# Patient Record
Sex: MALE | Race: WHITE | NOT HISPANIC OR LATINO | URBAN - METROPOLITAN AREA
[De-identification: names, ages, dates, MRNs, and addresses within clinical notes are randomized per-mention and may not be internally consistent; named-entity substitution may affect disease eponyms.]

---

## 2021-11-12 ENCOUNTER — OUT OF OFFICE VISIT (OUTPATIENT)
Dept: URBAN - METROPOLITAN AREA MEDICAL CENTER 30 | Facility: MEDICAL CENTER | Age: 56
End: 2021-11-12
Payer: SELF-PAY

## 2021-11-12 DIAGNOSIS — K92.1 ACUTE MELENA: ICD-10-CM

## 2021-11-12 DIAGNOSIS — D50.9 ANEMIA: ICD-10-CM

## 2021-11-12 DIAGNOSIS — K31.7 BENIGN GASTRIC POLYP: ICD-10-CM

## 2021-11-12 DIAGNOSIS — Z87.11 H/O PEPTIC ULCER: ICD-10-CM

## 2021-11-12 DIAGNOSIS — F10.10 AA (ALCOHOL ABUSE): ICD-10-CM

## 2021-11-12 DIAGNOSIS — K31.89 ACQUIRED DEFORMITY OF DUODENUM: ICD-10-CM

## 2021-11-12 DIAGNOSIS — Z79.1 ENCNTR LONG-TERM NSAID USE: ICD-10-CM

## 2021-11-12 PROCEDURE — 43239 EGD BIOPSY SINGLE/MULTIPLE: CPT | Performed by: INTERNAL MEDICINE

## 2021-11-12 PROCEDURE — 99253 IP/OBS CNSLTJ NEW/EST LOW 45: CPT | Performed by: INTERNAL MEDICINE

## 2021-11-15 ENCOUNTER — TELEPHONE ENCOUNTER (OUTPATIENT)
Dept: URBAN - METROPOLITAN AREA CLINIC 74 | Facility: CLINIC | Age: 56
End: 2021-11-15

## 2021-11-17 ENCOUNTER — OFFICE VISIT (OUTPATIENT)
Dept: URBAN - METROPOLITAN AREA CLINIC 74 | Facility: CLINIC | Age: 56
End: 2021-11-17
Payer: SELF-PAY

## 2021-11-17 ENCOUNTER — WEB ENCOUNTER (OUTPATIENT)
Dept: URBAN - METROPOLITAN AREA CLINIC 74 | Facility: CLINIC | Age: 56
End: 2021-11-17

## 2021-11-17 VITALS
HEART RATE: 101 BPM | DIASTOLIC BLOOD PRESSURE: 60 MMHG | TEMPERATURE: 98.1 F | SYSTOLIC BLOOD PRESSURE: 150 MMHG | WEIGHT: 286 LBS | BODY MASS INDEX: 42.36 KG/M2 | OXYGEN SATURATION: 97 % | HEIGHT: 69 IN

## 2021-11-17 DIAGNOSIS — R42 LIGHTHEADED: ICD-10-CM

## 2021-11-17 DIAGNOSIS — D50.8 ACQUIRED IRON DEFICIENCY ANEMIA DUE TO DECREASED ABSORPTION: ICD-10-CM

## 2021-11-17 DIAGNOSIS — K29.90 GASTRITIS AND DUODENITIS: ICD-10-CM

## 2021-11-17 DIAGNOSIS — K44.9 HIATAL HERNIA: ICD-10-CM

## 2021-11-17 DIAGNOSIS — K92.1 GASTROINTESTINAL HEMORRHAGE WITH MELENA: ICD-10-CM

## 2021-11-17 DIAGNOSIS — K25.3 CAMERON LESION, ACUTE: ICD-10-CM

## 2021-11-17 PROBLEM — 196731005: Status: ACTIVE | Noted: 2021-11-17

## 2021-11-17 PROCEDURE — 99214 OFFICE O/P EST MOD 30 MIN: CPT | Performed by: INTERNAL MEDICINE

## 2021-11-17 RX ORDER — PANTOPRAZOLE SODIUM 40 MG/1
1 TABLET TABLET, DELAYED RELEASE ORAL ONCE A DAY
Status: ACTIVE | COMMUNITY

## 2021-11-17 RX ORDER — HYDROCODONE BITARTRATE AND ACETAMINOPHEN 5; 325 MG/1; MG/1
1 TABLET AS NEEDED TABLET ORAL
Status: ACTIVE | COMMUNITY

## 2021-11-17 NOTE — HPI-TODAY'S VISIT:
Hospital follow up. Pt seen by Dr. Summers and not known to me prior.  Unfortunately he has significant ongoing GI bleeding with melena and also bloody diarrhea.  This is been ongoing since Friday.  He feels lightheaded and does have some weakness. He started bleeding again Friday and now he notes black stool and also overt bloody diarrhea. Killian Hardy is a 56 y.o. yo male who was admitted on 11/12/2021 for GI blood loss.  The patient with a known history of PUD, S/P EGD in 1986, Chronic low back pain, L1 compression fracture, and Obesity presented to ED at  with GI bleeding started at 1:00 am. He stated that he woke up at 1:00 am with lower abdominal cramps and he had multiple black tarry stools with dark blood prior to EMT arrival and then he had a few episodes of tarry stools with bright red blood. He had syncopal episodes at home and EMT was called in. No previous history of GI blood loss. Remote history PUD in 1986 most likely secondary to ETOH. He is now drinking 20 oz of Rum 3-4 times per week. He takes Advil daily for his chronic back pain. He lives at home with his Mother and provide care for her. Arriving to ED with Hgb 12.1 and dropped to 9.6. BP 79/44 on arrival. No active bleeding in ED. Mother at bedside. The patient denies dyspepsia, dysphagia, odynophagia, hemoptysis, hematemesis, nausea, vomiting, regurgitation, constipation, diarrhea, hematochezia, fever, chills, chest pain, SOB, or any other GI complaints today. History of ETOH abuse and chewing Tobacco. Denies Illicit drug abuse. He is not vaccinated for Flu or COVID vaccine.  PROCEDURE: Upper GI Endoscopy    SURGEON:  Bryanna Summers MD    INDICATION: Melena and Unexplained iron deficiency anaemia   ATTENDING MD:  ENRIQUE Claros MD    ANESTHESIA: per cRNA      Procedure Note: After obtaining informed consent the patient was sedated. Throughout the procedure the patient's blood pressure, pulse and oxygen saturations were monitored continuously.  The adult Olympus high definition endoscope was lubricated and passed through the mouth to the second portion of the duodenum. The patient tolerated the procedure well. There were no immediate complications.    Findings:  Esophagus: Medium hiatal hernia. In hernia sac natasha's erosions- one large surrounded by elevated margin- biopsied. All clean based.  Stomach: Moderate erosive gastritis body and antrum- biopsied. Old blood noted in the stomach- rinsed and no re-accumulation. Duodenum: No gross lesions.    Impression: Hiatal hernia, natasha's erosions, erosive gastritis    Recommendations:  Continue PPI q12. Add carafate slurry qid. Await pathology. Advance diet as tolerated. If stable over next 24 hours can d/c with outpatient follow up.  Discussed with Dr Claros from [facility].    Bryanna Summers MD

## 2021-11-19 ENCOUNTER — LAB OUTSIDE AN ENCOUNTER (OUTPATIENT)
Dept: URBAN - METROPOLITAN AREA CLINIC 74 | Facility: CLINIC | Age: 56
End: 2021-11-19

## 2021-11-19 ENCOUNTER — DASHBOARD ENCOUNTERS (OUTPATIENT)
Age: 56
End: 2021-11-19

## 2021-11-19 ENCOUNTER — OFFICE VISIT (OUTPATIENT)
Dept: URBAN - METROPOLITAN AREA CLINIC 74 | Facility: CLINIC | Age: 56
End: 2021-11-19
Payer: SELF-PAY

## 2021-11-19 ENCOUNTER — TELEPHONE ENCOUNTER (OUTPATIENT)
Dept: URBAN - METROPOLITAN AREA CLINIC 74 | Facility: CLINIC | Age: 56
End: 2021-11-19

## 2021-11-19 DIAGNOSIS — K25.3 CAMERON LESION, ACUTE: ICD-10-CM

## 2021-11-19 DIAGNOSIS — D64.9 ANEMIA: ICD-10-CM

## 2021-11-19 DIAGNOSIS — Z12.11 COLON CANCER SCREENING: ICD-10-CM

## 2021-11-19 DIAGNOSIS — K92.1 GASTROINTESTINAL HEMORRHAGE WITH MELENA: ICD-10-CM

## 2021-11-19 PROCEDURE — 99213 OFFICE O/P EST LOW 20 MIN: CPT | Performed by: INTERNAL MEDICINE

## 2021-11-19 RX ORDER — HYDROCODONE BITARTRATE AND ACETAMINOPHEN 5; 325 MG/1; MG/1
1 TABLET AS NEEDED TABLET ORAL
Status: ACTIVE | COMMUNITY

## 2021-11-19 RX ORDER — PANTOPRAZOLE SODIUM 40 MG/1
1 TABLET TABLET, DELAYED RELEASE ORAL ONCE A DAY
Status: ACTIVE | COMMUNITY

## 2021-11-19 RX ORDER — PANTOPRAZOLE SODIUM 40 MG/1
1 TABLET TABLET, DELAYED RELEASE ORAL TWICE DAILY
Qty: 180 | Refills: 0

## 2021-11-19 RX ORDER — SUCRALFATE 1 G/1
1 TABLET DISSOLVED IN 4 OUNCES OF WATER ON AN EMPTY STOMACH TABLET ORAL
Qty: 120 | Refills: 0

## 2021-11-19 NOTE — PHYSICAL EXAM CONSTITUTIONAL:
Obese WM well developed, well nourished , in no acute distress , ambulating with a cane , normal communication ability

## 2021-11-19 NOTE — HPI-TODAY'S VISIT:
Mr. Hardy presents to clinic for follow-up.  He was seen in the hospital when admitted between November 12-13 secondary to melena.  EGD showed a moderate size hiatal hernia with Fer's erosions as well as erosive gastritis.  He was discharged on twice a day PPI and Carafate.  Patient ended up going to the emergency room on the advice of Dr. Navarrete when he saw him as an acute visit.  His hemoglobin was stable on 2 checks.  Patient states has not had a black stool since this past weekend.  He has been following a reflux diet and staying away from NSAIDs and alcohol.  He has occasional left-sided discomfort.  No nausea or heartburn.  He denies any dysphagia.

## 2022-01-21 ENCOUNTER — OFFICE VISIT (OUTPATIENT)
Dept: URBAN - METROPOLITAN AREA SURGERY CENTER 30 | Facility: SURGERY CENTER | Age: 57
End: 2022-01-21

## 2022-02-10 ENCOUNTER — OFFICE VISIT (OUTPATIENT)
Dept: URBAN - METROPOLITAN AREA CLINIC 74 | Facility: CLINIC | Age: 57
End: 2022-02-10